# Patient Record
Sex: FEMALE | URBAN - METROPOLITAN AREA
[De-identification: names, ages, dates, MRNs, and addresses within clinical notes are randomized per-mention and may not be internally consistent; named-entity substitution may affect disease eponyms.]

---

## 2017-08-01 ENCOUNTER — IMPORTED ENCOUNTER (OUTPATIENT)
Dept: URBAN - METROPOLITAN AREA CLINIC 9 | Facility: CLINIC | Age: 67
End: 2017-08-01

## 2017-08-18 ENCOUNTER — IMPORTED ENCOUNTER (OUTPATIENT)
Dept: URBAN - METROPOLITAN AREA CLINIC 9 | Facility: CLINIC | Age: 67
End: 2017-08-18

## 2017-09-05 ENCOUNTER — IMPORTED ENCOUNTER (OUTPATIENT)
Dept: URBAN - METROPOLITAN AREA CLINIC 9 | Facility: CLINIC | Age: 67
End: 2017-09-05

## 2017-10-04 ENCOUNTER — IMPORTED ENCOUNTER (OUTPATIENT)
Dept: URBAN - METROPOLITAN AREA CLINIC 9 | Facility: CLINIC | Age: 67
End: 2017-10-04

## 2018-07-06 ENCOUNTER — IMPORTED ENCOUNTER (OUTPATIENT)
Dept: URBAN - METROPOLITAN AREA CLINIC 9 | Facility: CLINIC | Age: 68
End: 2018-07-06

## 2019-06-03 NOTE — PATIENT DISCUSSION
CATARACTS, OU - DISCUSSED PATIENT IS NOT A GOOD CANDIDATE FOR LASIK DUE TO HIS CATATACTS.   WILL HAVE PATIENT COME BACK FOR ADVANCED TESTING AND BIOMETRY, WILL NOT HAVE TO BE RE-DILATED AT NEXT VISIT

## 2019-06-10 NOTE — PATIENT DISCUSSION
Surgery  Counseling: I have discussed the option of glasses versus cataract surgery versus following. It was explained that when vision no longer meets the patient's visual needs and a new prescription for glasses is not likely to improve the patient's visual symptoms, the option of cataract surgery is a reasonable next step. It was explained that there is no guarantee that removing the cataract will improve their visual symptoms, however; it is believed that the cataract is contributing to the patient's visual impairment and surgery may significantly improve both the visual and functional status of the patient. The risks, benefits and alternatives of surgery were discussed with the patient. After this discussion, the patient desires to proceed with cataract surgery with implantation of an intraocular lens to improve vision for glare.

## 2019-06-10 NOTE — PATIENT DISCUSSION
REFRACTIVE ERROR- OPTS DISC W/ PT. PT UNDERSTANDS AND ELECTS TO PROCEED W/ REFRACTIVE CATARACT SURGERY FOR DISTANCE VISION IN BOTH EYES. THE PT UNDERSTANDS GLASSES WILL BE NEEDED FOR ALL NEAR AND INTERMEDIATE ACTIVITIES.

## 2019-06-10 NOTE — PATIENT DISCUSSION
S/P LASIK OU -DISCUSSED WITH PATIENT OPTION OF ORA DUE TO DECREASED PREDICTABILITY OF MEASUREMENTS DURING CATARACT SURGERY.

## 2019-06-10 NOTE — PATIENT DISCUSSION
**For patient's undergoing cataract surgery- In the event you decline a refractive package, traditional cataract surgery will be performed and a monofocal IOL will be implanted to target distance vision (or plano).

## 2019-07-25 NOTE — PATIENT DISCUSSION
***This patient had refractive cataract surgery performed. A monofocal toric IOL was placed to achieve a target refraction of plano (which should provide them with satisfactory distance vision with the aid of glasses/contact lenses). ***

## 2019-08-16 NOTE — PATIENT DISCUSSION
S/P PC IOL, OS-  DOING WELL. CONTINUE DROPS AS DIRECTED. RECOMMEND RETURN X 1 MO FOR FINAL DFE &amp; REFRACTION.

## 2019-10-08 NOTE — PATIENT DISCUSSION
S/P PC IOL, OS-  DOING WELL. CONTINUE DROPS AS DIRECTED. GLS RX GIVEN. FOLLOW PRN. PT FOLLOWS W/ DR. Chago Tapia.

## 2021-10-16 ASSESSMENT — KERATOMETRY
OS_K1POWER_DIOPTERS: 42.25
OS_K2POWER_DIOPTERS: 44.25
OS_K2POWER_DIOPTERS: 44.5
OD_K1POWER_DIOPTERS: 42.5
OD_AXISANGLE_DEGREES: 171
OD_AXISANGLE2_DEGREES: 95
OS_AXISANGLE_DEGREES: 174
OD_K2POWER_DIOPTERS: 44.5
OD_AXISANGLE_DEGREES: 5
OS_AXISANGLE2_DEGREES: 88
OS_AXISANGLE2_DEGREES: 84
OS_AXISANGLE2_DEGREES: 86
OS_K1POWER_DIOPTERS: 42
OS_K1POWER_DIOPTERS: 42.25
OD_AXISANGLE_DEGREES: 3
OD_AXISANGLE2_DEGREES: 93
OD_K2POWER_DIOPTERS: 44.25
OD_K1POWER_DIOPTERS: 42
OD_AXISANGLE2_DEGREES: 81
OS_K2POWER_DIOPTERS: 44
OS_AXISANGLE_DEGREES: 176
OS_AXISANGLE_DEGREES: 178
OD_K2POWER_DIOPTERS: 44
OD_K1POWER_DIOPTERS: 43

## 2021-10-16 ASSESSMENT — VISUAL ACUITY
OD_CC: 20/20 -2 SN
OS_SC: 20/60 - SN
OD_SC: CF 1FT SN
OS_SC: CF 1FT SN
OD_PH: 20/40 -2 SN
OS_CC: 20/20 - SN
OS_SC: 20/70 +2 SN
OD_CC: 20/30 + SN
OS_CC: 20/30 + SN
OD_SC: 20/30 - SN
OD_SC: 20/25 SN

## 2021-10-16 ASSESSMENT — TONOMETRY
OS_IOP_MMHG: 10
OD_IOP_MMHG: 7
OS_IOP_MMHG: 18
OD_IOP_MMHG: 11
OS_IOP_MMHG: 8
OD_IOP_MMHG: 16
OS_IOP_MMHG: 9